# Patient Record
Sex: MALE | Race: WHITE | NOT HISPANIC OR LATINO | Employment: UNEMPLOYED | URBAN - METROPOLITAN AREA
[De-identification: names, ages, dates, MRNs, and addresses within clinical notes are randomized per-mention and may not be internally consistent; named-entity substitution may affect disease eponyms.]

---

## 2019-12-04 ENCOUNTER — HOSPITAL ENCOUNTER (EMERGENCY)
Facility: HOSPITAL | Age: 29
Discharge: HOME | End: 2019-12-04
Attending: EMERGENCY MEDICINE
Payer: MEDICAID

## 2019-12-04 VITALS
HEART RATE: 89 BPM | OXYGEN SATURATION: 98 % | TEMPERATURE: 98 F | SYSTOLIC BLOOD PRESSURE: 148 MMHG | WEIGHT: 175 LBS | BODY MASS INDEX: 25.05 KG/M2 | HEIGHT: 70 IN | RESPIRATION RATE: 19 BRPM | DIASTOLIC BLOOD PRESSURE: 84 MMHG

## 2019-12-04 DIAGNOSIS — R51.9 NONINTRACTABLE HEADACHE, UNSPECIFIED CHRONICITY PATTERN, UNSPECIFIED HEADACHE TYPE: Primary | ICD-10-CM

## 2019-12-04 PROCEDURE — 96372 THER/PROPH/DIAG INJ SC/IM: CPT | Mod: 59

## 2019-12-04 PROCEDURE — 99284 EMERGENCY DEPT VISIT MOD MDM: CPT | Mod: 25

## 2019-12-04 RX ORDER — KETOROLAC TROMETHAMINE 30 MG/ML
30 INJECTION, SOLUTION INTRAMUSCULAR; INTRAVENOUS
Status: DISCONTINUED | OUTPATIENT
Start: 2019-12-04 | End: 2019-12-04 | Stop reason: HOSPADM

## 2019-12-04 NOTE — ED NOTES
"Pt presents to ED with complaint of persistent severe headache x 3.5 weeks, pt is awake and alert, symptoms are not worse today compared to prior, pt states, "I wanted to get my head checked today, I was supposed to get an MRI tomorrow but it was cancelled"  "

## 2019-12-04 NOTE — ED PROVIDER NOTES
Encounter Date: 12/4/2019       History     Chief Complaint   Patient presents with    Headache     Patient with a history of occasional headaches.  He reports intermittent right-sided headache for approximately 2 weeks.  Patient was seen at a clinic.  Headache persisted.  No treatment prior to arrival.  There has been no neck stiffness. No fever or chills. Currently headache is moderate in nature.  Patient denies any trouble with vision, speech or weakness.        Review of patient's allergies indicates:  No Known Allergies  No past medical history on file.  No past surgical history on file.  No family history on file.  Social History     Tobacco Use    Smoking status: Not on file   Substance Use Topics    Alcohol use: Not on file    Drug use: Not on file     Review of Systems   Constitutional: Negative for chills and fever.   HENT: Negative for sore throat.    Eyes: Negative for photophobia and visual disturbance.   Respiratory: Negative for shortness of breath.    Cardiovascular: Negative for chest pain.   Gastrointestinal: Negative for abdominal pain and vomiting.   Genitourinary: Negative for dysuria.   Musculoskeletal: Negative for joint swelling.   Skin: Negative for rash.   Neurological: Positive for headaches. Negative for weakness.   Psychiatric/Behavioral: Negative for confusion.       Physical Exam     Initial Vitals [12/04/19 0841]   BP Pulse Resp Temp SpO2   (!) 157/87 83 20 98.6 °F (37 °C) 99 %      MAP       --         Physical Exam    Nursing note and vitals reviewed.  Constitutional: He is not diaphoretic. No distress.   HENT:   Head: Normocephalic and atraumatic.   Eyes: Conjunctivae are normal.   Neck: Normal range of motion.   Cardiovascular: Regular rhythm.   Pulmonary/Chest: Breath sounds normal.   Abdominal: Soft. There is no tenderness.   Musculoskeletal: Normal range of motion.   Neurological: He is alert and oriented to person, place, and time. He has normal strength. No cranial nerve  deficit or sensory deficit.   Skin: No rash noted.   Psychiatric: He has a normal mood and affect.         ED Course   Procedures  Labs Reviewed - No data to display       Imaging Results    None          Medical Decision Making:   History:   Old Medical Records: I decided to obtain old medical records.  Clinical Tests:   Radiological Study: Reviewed  ED Management:  Patient presents with headache. Patient refuses medication here.  Patient is driving.  Will have patient take NSAIDs.  Appointment made for health unit tomorrow at 9:30 a.m. for further evaluation                                 Clinical Impression:       ICD-10-CM ICD-9-CM   1. Nonintractable headache, unspecified chronicity pattern, unspecified headache type R51 784.0                             Zeus Newton MD  12/04/19 0944

## 2019-12-04 NOTE — ED NOTES
Pt discharged to home, VSS, GCS 15, no distress noted, ambulates with steady gait, DC instructions given to pt, pt verbalized understanding

## 2019-12-08 ENCOUNTER — HOSPITAL ENCOUNTER (EMERGENCY)
Facility: HOSPITAL | Age: 29
Discharge: HOME | End: 2019-12-08
Attending: EMERGENCY MEDICINE
Payer: MEDICAID

## 2019-12-08 VITALS
TEMPERATURE: 99 F | SYSTOLIC BLOOD PRESSURE: 151 MMHG | BODY MASS INDEX: 28.63 KG/M2 | HEIGHT: 70 IN | HEART RATE: 73 BPM | WEIGHT: 200 LBS | OXYGEN SATURATION: 100 % | RESPIRATION RATE: 20 BRPM | DIASTOLIC BLOOD PRESSURE: 73 MMHG

## 2019-12-08 DIAGNOSIS — J34.89 SINUS PRESSURE: ICD-10-CM

## 2019-12-08 DIAGNOSIS — R51.9 NONINTRACTABLE HEADACHE, UNSPECIFIED CHRONICITY PATTERN, UNSPECIFIED HEADACHE TYPE: Primary | ICD-10-CM

## 2019-12-08 PROCEDURE — 99284 EMERGENCY DEPT VISIT MOD MDM: CPT | Mod: 25

## 2019-12-08 PROCEDURE — 96374 THER/PROPH/DIAG INJ IV PUSH: CPT

## 2019-12-08 PROCEDURE — 96375 TX/PRO/DX INJ NEW DRUG ADDON: CPT

## 2019-12-08 PROCEDURE — 63600175 PHARM REV CODE 636 W HCPCS: Performed by: PHYSICIAN ASSISTANT

## 2019-12-08 RX ORDER — METOCLOPRAMIDE HYDROCHLORIDE 5 MG/ML
10 INJECTION INTRAMUSCULAR; INTRAVENOUS
Status: COMPLETED | OUTPATIENT
Start: 2019-12-08 | End: 2019-12-08

## 2019-12-08 RX ORDER — FLUTICASONE PROPIONATE 50 MCG
1 SPRAY, SUSPENSION (ML) NASAL 2 TIMES DAILY PRN
Qty: 15 G | Refills: 0 | Status: SHIPPED | OUTPATIENT
Start: 2019-12-08

## 2019-12-08 RX ORDER — CETIRIZINE HYDROCHLORIDE 10 MG/1
10 TABLET ORAL DAILY
Qty: 30 TABLET | Refills: 0 | Status: SHIPPED | OUTPATIENT
Start: 2019-12-08 | End: 2020-12-07

## 2019-12-08 RX ORDER — KETOROLAC TROMETHAMINE 30 MG/ML
30 INJECTION, SOLUTION INTRAMUSCULAR; INTRAVENOUS
Status: COMPLETED | OUTPATIENT
Start: 2019-12-08 | End: 2019-12-08

## 2019-12-08 RX ORDER — DIPHENHYDRAMINE HYDROCHLORIDE 50 MG/ML
25 INJECTION INTRAMUSCULAR; INTRAVENOUS
Status: COMPLETED | OUTPATIENT
Start: 2019-12-08 | End: 2019-12-08

## 2019-12-08 RX ORDER — IBUPROFEN 600 MG/1
600 TABLET ORAL EVERY 8 HOURS PRN
Qty: 20 TABLET | Refills: 0 | Status: SHIPPED | OUTPATIENT
Start: 2019-12-08

## 2019-12-08 RX ADMIN — METOCLOPRAMIDE 10 MG: 5 INJECTION, SOLUTION INTRAMUSCULAR; INTRAVENOUS at 11:12

## 2019-12-08 RX ADMIN — DIPHENHYDRAMINE HYDROCHLORIDE 25 MG: 50 INJECTION INTRAMUSCULAR; INTRAVENOUS at 11:12

## 2019-12-08 RX ADMIN — KETOROLAC TROMETHAMINE 30 MG: 30 INJECTION, SOLUTION INTRAMUSCULAR at 11:12

## 2019-12-08 RX ADMIN — SODIUM CHLORIDE 1000 ML: 0.9 INJECTION, SOLUTION INTRAVENOUS at 11:12

## 2019-12-08 NOTE — ED NOTES
"Pt reports pain in the right side of his head x 1 month.  States that he has a hx of HTN and has been taking amlodipine.  Pt says about a month ago he was driving home from [place] and his neck was hurting and that he was rubbing his right side of the neck and then immediately felt a shooting pain then dullness in his head.  He states that since this episode he has had a dullness in his head, tinnitus, and had a visual change "i'm seeing like a darker orange color out of the right eye."  "

## 2019-12-08 NOTE — ED PROVIDER NOTES
"Encounter Date: 12/8/2019    SCRIBE #1 NOTE: I, Holger Bailey, am scribing for, and in the presence of, Dasia Khan PA-C.       History     Chief Complaint   Patient presents with    Headache     x 1 month / CT done at [facility]     Time seen by provider: 10:32 AM on 12/08/2019      Jasper Huang is a 29 y.o. male with a PMHx of "chronic headaches" who presents to the ED for a headache that started 1 month ago. The patient states that this headache started in the right side of his neck as a dull like pain that radiated up to the right skull. He reports that this headache started when traveling to see his girlfriend in [place], reporting "It started when I was driving back."He states that this headache is more of a pressure like pain on the right side. Currently, he states that this headache does migrate to the left side. He does admit that "I occasionally get waves of tingling on my right side of the face with this headache." He states that this headache rates from a 1/10 to 3/10 in intensity at time.Patient reports that he has had a "yellowish tint" in his right visual field since the onset of his headache. He also admits that he is having "tinnintus" in both ears starting 1 month ago. He does reports that he has had "Blood when I cleaned my ears when this headache started." Patient reports that he is having upper right dental pain that has been present for the last month. He states that "I think I need a root canal because I feel it throbbing. I also have my wisdom teeth coming in right now." Patient states that he has had some "mental fogginess" with this headache. Patient does admit with onset of this headache he was seen at [facility], where they prescribed the patient Amlodipine for his high blood pressure and referred the patient to [facility] for a MRI. Patient states that he also had a CT scan at [facility] which was benign. He admits that he was given a steroid shot for the pain on a second visit, which offered some relief. He " does report he has taken acetaminophen for the headache with some relief. The patient denies weakness, speech difficulties, LOC, fever, tremors, nausea, vomiting, or any other complaint at this time. The patient has no pertinent PSHx.    The history is provided by the patient.     Review of patient's allergies indicates:  No Known Allergies  No past medical history on file.  No past surgical history on file.  No family history on file.  Social History     Tobacco Use    Smoking status: Not on file   Substance Use Topics    Alcohol use: Not on file    Drug use: Not on file     Review of Systems   Constitutional: Negative for chills and fever.   HENT: Positive for dental problem, ear discharge and tinnitus. Negative for facial swelling and trouble swallowing.         Tinnitus   Eyes: Positive for visual disturbance. Negative for pain and discharge.   Respiratory: Negative for cough, chest tightness, shortness of breath and wheezing.    Cardiovascular: Negative for chest pain and palpitations.   Gastrointestinal: Negative for abdominal pain, diarrhea, nausea and vomiting.   Genitourinary: Negative for dysuria and hematuria.   Musculoskeletal: Negative for arthralgias, back pain, joint swelling, myalgias, neck pain and neck stiffness.   Skin: Negative for color change, pallor, rash and wound.   Neurological: Positive for numbness and headaches. Negative for dizziness, syncope, facial asymmetry, speech difficulty, weakness and light-headedness.   Hematological: Does not bruise/bleed easily.   Psychiatric/Behavioral: The patient is not nervous/anxious.        Physical Exam     Initial Vitals [12/08/19 1015]   BP Pulse Resp Temp SpO2   (!) 151/73 97 20 98.7 °F (37.1 °C) 99 %      MAP       --         Physical Exam    Nursing note and vitals reviewed.  Constitutional: He appears well-developed and well-nourished. He is not diaphoretic. No distress.   HENT:   Head: Normocephalic and atraumatic.   Right Ear: External ear  normal.   Left Ear: External ear normal.   Nose: Nose normal.   Mouth/Throat: Oropharynx is clear and moist.   Eyes: Conjunctivae and EOM are normal. Pupils are equal, round, and reactive to light.   Neck: Normal range of motion. Neck supple.   Cardiovascular: Normal rate, regular rhythm, normal heart sounds and intact distal pulses. Exam reveals no gallop and no friction rub.    No murmur heard.  Pulmonary/Chest: Breath sounds normal. No respiratory distress. He has no wheezes. He has no rhonchi. He has no rales.   Abdominal: Soft. He exhibits no distension and no mass. There is no tenderness.   Musculoskeletal: Normal range of motion. He exhibits no edema or tenderness.   Lymphadenopathy:     He has no cervical adenopathy.   Neurological: He is alert and oriented to person, place, and time. He has normal strength. No cranial nerve deficit or sensory deficit. GCS score is 15. GCS eye subscore is 4. GCS verbal subscore is 5. GCS motor subscore is 6.   No focal neurological deficits noted.  Cranial nerves III-XII grossly intact.  Equal, rapid alternating movements noted to bilateral upper and lower extremities.    Skin: Skin is warm and dry. No rash and no abscess noted. No erythema.   Psychiatric: He has a normal mood and affect.         ED Course   Procedures  Labs Reviewed - No data to display       Imaging Results    None          Medical Decision Making:   History:   Old Medical Records: I decided to obtain old medical records.  Old Records Summarized: records from clinic visits and records from previous admission(s).  Differential Diagnosis:   Migraine  Acute intracranial process  Sinusitis  Trigeminal neuralgia         APC / Resident Notes:   Recent CT scan of head performed at [facility] and is reviewed to be negative for acute process.  No focal neurological deficits on exam today and no need for repeat CT imaging or MRI.  Pt is well appearing and is feeling better after medications here in the ED (IV fluids,  Toradol, Reglan and Benadryl).  Given the associated right sided facial pressure, right ear pressure and tinnitus; there is likely a component of sinusitis.  His symptoms have been recurring intermittently over the last month.  He is referred to neurology for further evaluation and is encouraged to take Zyrtec and Flonase daily.  No need for antibiotics.  He voices understanding and is agreeable to the plan.  He is given specific return precautions.          Scribe Attestation:   Scribe #1: I performed the above scribed service and the documentation accurately describes the services I performed. I attest to the accuracy of the note.    Attending Attestation:     Physician Attestation Statement for NP/PA:   I discussed this assessment and plan of this patient with the NP/PA, but I did not personally examine the patient. The face to face encounter was performed by the NP/PA.    Other NP/PA Attestation Additions:    History of Present Illness: 29-year-old male presented with a chief complaint of a headache.    Medical Decision Making: Initial differential diagnosis included but not limited to tension headache, migraine headache, and cluster headache.  I am in agreement with the physician assistant's  assessment, treatment, and plan of care.         I, Dasia Khan PA-C, personally performed the services described in this documentation. All medical record entries made by the scribe were at my direction and in my presence.  I have reviewed the chart and agree that the record reflects my personal performance and is accurate and complete. Dasia Khan PA-C.  2:42 PM 12/08/2019                        Clinical Impression:       ICD-10-CM ICD-9-CM   1. Nonintractable headache, unspecified chronicity pattern, unspecified headache type R51 784.0   2. Sinus pressure J34.89 478.19         Disposition:   Disposition: Discharged  Condition: Stable                     Dasia Khan PA-C  12/08/19 1442       [place]  AZRA Rosenthal MD  12/08/19 6342